# Patient Record
Sex: FEMALE | URBAN - METROPOLITAN AREA
[De-identification: names, ages, dates, MRNs, and addresses within clinical notes are randomized per-mention and may not be internally consistent; named-entity substitution may affect disease eponyms.]

---

## 2020-04-24 ENCOUNTER — NEW PATIENT (OUTPATIENT)
Dept: URBAN - METROPOLITAN AREA CLINIC 44 | Facility: CLINIC | Age: 85
End: 2020-04-24
Payer: COMMERCIAL

## 2020-04-24 DIAGNOSIS — H52.4 PRESBYOPIA: ICD-10-CM

## 2020-04-24 PROCEDURE — 92015 DETERMINE REFRACTIVE STATE: CPT | Performed by: OPTOMETRIST

## 2020-04-24 PROCEDURE — 92134 CPTRZ OPH DX IMG PST SGM RTA: CPT | Performed by: OPTOMETRIST

## 2020-04-24 PROCEDURE — 92004 COMPRE OPH EXAM NEW PT 1/>: CPT | Performed by: OPTOMETRIST

## 2020-04-24 ASSESSMENT — KERATOMETRY
OD: 46.00
OS: 45.75

## 2020-04-24 ASSESSMENT — INTRAOCULAR PRESSURE
OS: 16
OD: 14

## 2020-04-24 ASSESSMENT — VISUAL ACUITY
OS: 20/30
OD: 20/40

## 2020-05-04 ENCOUNTER — FOLLOW UP ESTABLISHED (OUTPATIENT)
Dept: URBAN - METROPOLITAN AREA CLINIC 44 | Facility: CLINIC | Age: 85
End: 2020-05-04
Payer: COMMERCIAL

## 2020-05-04 DIAGNOSIS — H35.3131 NEXDTVE AGE-RELATED MCLR DEGN, BILATERAL, EARLY DRY STAGE: ICD-10-CM

## 2020-05-04 DIAGNOSIS — E11.3291 TYPE 2 DIAB WITH MILD NONP RTNOP WITHOUT MCLR EDEMA, R EYE: ICD-10-CM

## 2020-05-04 PROCEDURE — 76514 ECHO EXAM OF EYE THICKNESS: CPT | Performed by: OPHTHALMOLOGY

## 2020-05-04 PROCEDURE — 92083 EXTENDED VISUAL FIELD XM: CPT | Performed by: OPHTHALMOLOGY

## 2020-05-04 PROCEDURE — 92004 COMPRE OPH EXAM NEW PT 1/>: CPT | Performed by: OPHTHALMOLOGY

## 2020-05-04 PROCEDURE — 92133 CPTRZD OPH DX IMG PST SGM ON: CPT | Performed by: OPHTHALMOLOGY

## 2020-05-04 PROCEDURE — 92020 GONIOSCOPY: CPT | Performed by: OPHTHALMOLOGY

## 2020-05-04 RX ORDER — LATANOPROST 50 UG/ML
0.005 % SOLUTION OPHTHALMIC
Qty: 3 | Refills: 1 | Status: INACTIVE
Start: 2020-05-04 | End: 2020-09-08

## 2020-05-04 RX ORDER — DORZOLAMIDE HYDROCHLORIDE AND TIMOLOL MALEATE 20; 5 MG/ML; MG/ML
SOLUTION/ DROPS OPHTHALMIC
Qty: 3 | Refills: 1 | Status: INACTIVE
Start: 2020-05-04 | End: 2020-06-26

## 2020-05-04 ASSESSMENT — INTRAOCULAR PRESSURE
OS: 19
OD: 18

## 2020-06-26 ENCOUNTER — FOLLOW UP ESTABLISHED (OUTPATIENT)
Dept: URBAN - METROPOLITAN AREA CLINIC 44 | Facility: CLINIC | Age: 85
End: 2020-06-26
Payer: COMMERCIAL

## 2020-06-26 DIAGNOSIS — E11.3293 DIABETES MELLITUS TYPE 2 WITH MILD NON-PROLIFERATI: ICD-10-CM

## 2020-06-26 PROCEDURE — 92012 INTRM OPH EXAM EST PATIENT: CPT | Performed by: OPHTHALMOLOGY

## 2020-06-26 PROCEDURE — 92250 FUNDUS PHOTOGRAPHY W/I&R: CPT | Performed by: OPHTHALMOLOGY

## 2020-06-26 RX ORDER — DORZOLAMIDE HYDROCHLORIDE AND TIMOLOL MALEATE 20; 5 MG/ML; MG/ML
SOLUTION/ DROPS OPHTHALMIC
Qty: 3 | Refills: 1 | Status: INACTIVE
Start: 2020-06-26 | End: 2021-01-25

## 2020-06-26 ASSESSMENT — INTRAOCULAR PRESSURE
OS: 12
OD: 12

## 2020-10-05 ENCOUNTER — FOLLOW UP ESTABLISHED (OUTPATIENT)
Dept: URBAN - METROPOLITAN AREA CLINIC 44 | Facility: CLINIC | Age: 85
End: 2020-10-05
Payer: COMMERCIAL

## 2020-10-05 PROCEDURE — 92014 COMPRE OPH EXAM EST PT 1/>: CPT | Performed by: OPHTHALMOLOGY

## 2020-10-05 ASSESSMENT — INTRAOCULAR PRESSURE
OD: 12
OS: 13

## 2021-01-25 ENCOUNTER — FOLLOW UP ESTABLISHED (OUTPATIENT)
Dept: URBAN - METROPOLITAN AREA CLINIC 44 | Facility: CLINIC | Age: 86
End: 2021-01-25
Payer: COMMERCIAL

## 2021-01-25 PROCEDURE — 92012 INTRM OPH EXAM EST PATIENT: CPT | Performed by: OPHTHALMOLOGY

## 2021-01-25 RX ORDER — DORZOLAMIDE HYDROCHLORIDE AND TIMOLOL MALEATE 20; 5 MG/ML; MG/ML
SOLUTION/ DROPS OPHTHALMIC
Qty: 15 | Refills: 1 | Status: ACTIVE
Start: 2021-01-25

## 2021-01-25 RX ORDER — LATANOPROST 50 UG/ML
0.005 % SOLUTION OPHTHALMIC
Qty: 7.5 | Refills: 1 | Status: INACTIVE
Start: 2021-01-25 | End: 2021-05-17

## 2021-01-25 ASSESSMENT — INTRAOCULAR PRESSURE
OD: 13
OS: 13

## 2021-02-09 ENCOUNTER — FOLLOW UP ESTABLISHED (OUTPATIENT)
Dept: URBAN - METROPOLITAN AREA CLINIC 44 | Facility: CLINIC | Age: 86
End: 2021-02-09
Payer: COMMERCIAL

## 2021-02-09 DIAGNOSIS — H26.492 OTHER SECONDARY CATARACT, LEFT EYE: ICD-10-CM

## 2021-02-09 DIAGNOSIS — H35.363 DRUSEN (DEGENERATIVE) OF MACULA, BILATERAL: ICD-10-CM

## 2021-02-09 PROCEDURE — 92134 CPTRZ OPH DX IMG PST SGM RTA: CPT | Performed by: OPTOMETRIST

## 2021-02-09 PROCEDURE — 99213 OFFICE O/P EST LOW 20 MIN: CPT | Performed by: OPTOMETRIST

## 2021-02-09 ASSESSMENT — KERATOMETRY
OD: 45.88
OS: 45.38

## 2021-02-09 ASSESSMENT — INTRAOCULAR PRESSURE
OD: 10
OS: 10

## 2021-02-09 ASSESSMENT — VISUAL ACUITY
OS: 20/25
OD: 20/30

## 2021-04-01 ENCOUNTER — OFFICE VISIT (OUTPATIENT)
Dept: URBAN - METROPOLITAN AREA CLINIC 44 | Facility: CLINIC | Age: 86
End: 2021-04-01
Payer: COMMERCIAL

## 2021-04-01 PROCEDURE — 92133 CPTRZD OPH DX IMG PST SGM ON: CPT | Performed by: OPTOMETRIST

## 2021-04-01 PROCEDURE — 99215 OFFICE O/P EST HI 40 MIN: CPT | Performed by: OPTOMETRIST

## 2021-04-01 PROCEDURE — 92134 CPTRZ OPH DX IMG PST SGM RTA: CPT | Performed by: OPTOMETRIST

## 2021-04-01 ASSESSMENT — INTRAOCULAR PRESSURE
OS: 13
OD: 13

## 2021-04-01 NOTE — IMPRESSION/PLAN
Impression: Nexdtve age-related mclr degn, bilateral, early dry stage: H35.3131. Plan: Reviewed risk associated with smoking and lifestyle changes that support good eye health. Recommend Lutein/Zeaxanthin supplements to further reduce risk of progression.  RTC ASAP if notes and decreased vision or distortion in vision otherwise RTC 12 months for DFE/OCT (Mac)

## 2021-04-01 NOTE — IMPRESSION/PLAN
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 Plan: discussed findings with patient GCA vs CRAO OD. PLAN: Recommend ER for further evaluation. Rec Sed rate, ESR, CBC with platelets. STAT!!!  Patient Hx of DM. Consider stroke.  Carotid U/S, echocardiogram and imagining (CT or MRI of head and orbits with and without contrast)

## 2021-04-01 NOTE — IMPRESSION/PLAN
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 Plan: Patient reports good A1C and blood sugar levels. Mild vascular changes noted without macular edema. Stressed importance of good glycemic control and continue follow up with PCP. RTC 12 months for Dilated fundus exam /OCT (Mac).

## 2021-04-19 ENCOUNTER — OFFICE VISIT (OUTPATIENT)
Dept: URBAN - METROPOLITAN AREA CLINIC 44 | Facility: CLINIC | Age: 86
End: 2021-04-19
Payer: COMMERCIAL

## 2021-04-19 DIAGNOSIS — H40.1134 PRIMARY OPEN-ANGLE GLAUCOMA, BILATERAL, INDETERMINATE STAGE: Primary | ICD-10-CM

## 2021-04-19 DIAGNOSIS — H04.123 DRY EYE SYNDROME OF BILATERAL LACRIMAL GLANDS: ICD-10-CM

## 2021-04-19 PROCEDURE — 92083 EXTENDED VISUAL FIELD XM: CPT | Performed by: OPHTHALMOLOGY

## 2021-04-19 PROCEDURE — 99214 OFFICE O/P EST MOD 30 MIN: CPT | Performed by: OPHTHALMOLOGY

## 2021-04-19 RX ORDER — TIMOLOL MALEATE 5 MG/ML
0.5 % SOLUTION/ DROPS OPHTHALMIC
Qty: 5 | Refills: 2 | Status: INACTIVE
Start: 2021-04-19 | End: 2021-05-17

## 2021-04-19 RX ORDER — DORZOLAMIDE HCL 20 MG/ML
2 % SOLUTION/ DROPS OPHTHALMIC
Qty: 5 | Refills: 2 | Status: INACTIVE
Start: 2021-04-19 | End: 2021-05-17

## 2021-04-19 ASSESSMENT — INTRAOCULAR PRESSURE
OD: 19
OS: 20

## 2021-04-19 NOTE — IMPRESSION/PLAN
Impression: Diabetes mellitus Type 2 with mild non-proliferative retinopathy without macular edema, bilateral Plan: Discussed diet, exercise, nutrition. Good blood sugar and blood pressure control. Maintain follow up with PCP.  mild bdr ou

## 2021-04-19 NOTE — IMPRESSION/PLAN
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  Plan: pt had sudden vision loss od, Patient was started on Pred - cont ; rec to cont to follow with PCP/Neurology/Rheumatology

## 2021-04-19 NOTE — IMPRESSION/PLAN
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 
positive: afib(Heart  Problem DENIES:  Family Hx of Glaucoma/ Sulfa Allergy/ Lung Problems/ Sleep Apnea/Hx of Migraines Plan: PLAN for OU: On Latanoprost QHS OU and Cosopt QAM OU; VF is similar os but IOP is higher ou, may be steroid related and rec lower iop, , change cosopt to naseem qam ou, dorzol bid ou and cont xal qhs ou and rtc  1 mon for iop   ( patient is taking oral prednisone - advised of side effects - ELEVATED IOP)  ((TARGET ~< to determine OU))  ( Son also present ) TESTS:  reviewed 04/19/21 Visual Field - OD: unable-LP; OS: Poor-superior and inferior depression 6/26/20 Photo OD) cupping. Photo OS) cupping. 5/4/20 Pachs OD) Thin and increased risk. Pachs OS) Thin and increased risk. 5/4/20 OCT RNFL OD) 58 pole thinning. OCT RNFL OS) 69 sup and temp thinning. Discussed glaucoma diagnosis in detail with patient. Emphasized and explained compliance. Poor compliance can lead to blindness.  Educational materials provided

## 2021-05-17 ENCOUNTER — OFFICE VISIT (OUTPATIENT)
Dept: URBAN - METROPOLITAN AREA CLINIC 44 | Facility: CLINIC | Age: 86
End: 2021-05-17
Payer: COMMERCIAL

## 2021-05-17 DIAGNOSIS — M31.6 OTHER GIANT CELL ARTERITIS: Primary | ICD-10-CM

## 2021-05-17 DIAGNOSIS — Z79.84 LONG TERM (CURRENT) USE OF ORAL HYPOGLYCEMIC DRUGS: ICD-10-CM

## 2021-05-17 PROCEDURE — 99213 OFFICE O/P EST LOW 20 MIN: CPT | Performed by: OPHTHALMOLOGY

## 2021-05-17 RX ORDER — LATANOPROST 50 UG/ML
0.005 % SOLUTION OPHTHALMIC
Qty: 7.5 | Refills: 1 | Status: ACTIVE
Start: 2021-05-17

## 2021-05-17 RX ORDER — TIMOLOL MALEATE 5 MG/ML
0.5 % SOLUTION/ DROPS OPHTHALMIC
Qty: 5 | Refills: 2 | Status: ACTIVE
Start: 2021-05-17

## 2021-05-17 RX ORDER — DORZOLAMIDE HCL 20 MG/ML
2 % SOLUTION/ DROPS OPHTHALMIC
Qty: 5 | Refills: 2 | Status: ACTIVE
Start: 2021-05-17

## 2021-05-17 ASSESSMENT — INTRAOCULAR PRESSURE
OS: 15
OD: 16

## 2021-05-17 NOTE — IMPRESSION/PLAN
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  Plan: pt had sudden vision loss od, Patient was started on oral Pred - would continue prednisone due to diagnosis ; rec to cont to follow with PCP/Neurology/Rheumatology -- recommend PCP to refer to neurology/rheumatology as soon as possible given temporal arteritis diagnosis for long-term care and steroid mgmt

## 2021-05-17 NOTE — IMPRESSION/PLAN
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 
positive: afib(Heart  Problem DENIES:  Family Hx of Glaucoma/ Sulfa Allergy/ Lung Problems/ Sleep Apnea/Hx of Migraines Plan: PLAN for OU: On Latanoprost QHS OU, OFF Cosopt QAM OU; ON Timolol QAM OU, ON Dorzolamide BID OU; IOP is improved ou, so cont xal qhs ou, timolol qam ou and dorzolamide bid ou and rtc   1-2 mon for iop check/ OCT  ( patient is taking oral prednisone - advised of side effects - ELEVATED IOP)  ((TARGET ~< to determine OU))  ( Son also present ) TESTS:  reviewed 04/19/21 Visual Field - OD: unable-LP; OS: Poor-superior and inferior depression 6/26/20 Photo OD) cupping. Photo OS) cupping. 5/4/20 Pachs OD) Thin and increased risk. Pachs OS) Thin and increased risk. 5/4/20 OCT RNFL OD) 58 pole thinning. OCT RNFL OS) 69 sup and temp thinning. Discussed glaucoma diagnosis in detail with patient. Emphasized and explained compliance. Poor compliance can lead to blindness.  Educational materials provided